# Patient Record
Sex: MALE | Race: WHITE | NOT HISPANIC OR LATINO
[De-identification: names, ages, dates, MRNs, and addresses within clinical notes are randomized per-mention and may not be internally consistent; named-entity substitution may affect disease eponyms.]

---
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
## 2021-01-13 ENCOUNTER — APPOINTMENT (OUTPATIENT)
Dept: PEDIATRIC DEVELOPMENTAL SERVICES | Facility: CLINIC | Age: 21
End: 2021-01-13

## 2021-01-13 ENCOUNTER — APPOINTMENT (OUTPATIENT)
Dept: PEDIATRIC DEVELOPMENTAL SERVICES | Facility: CLINIC | Age: 21
End: 2021-01-13
Payer: COMMERCIAL

## 2021-01-13 PROCEDURE — 99214 OFFICE O/P EST MOD 30 MIN: CPT | Mod: 95

## 2021-03-07 ENCOUNTER — NON-APPOINTMENT (OUTPATIENT)
Age: 21
End: 2021-03-07

## 2021-03-27 ENCOUNTER — NON-APPOINTMENT (OUTPATIENT)
Age: 21
End: 2021-03-27

## 2021-06-07 ENCOUNTER — NON-APPOINTMENT (OUTPATIENT)
Age: 21
End: 2021-06-07

## 2021-06-14 ENCOUNTER — APPOINTMENT (OUTPATIENT)
Dept: PEDIATRIC DEVELOPMENTAL SERVICES | Facility: CLINIC | Age: 21
End: 2021-06-14
Payer: COMMERCIAL

## 2021-06-14 PROCEDURE — 99214 OFFICE O/P EST MOD 30 MIN: CPT | Mod: 95

## 2021-11-09 ENCOUNTER — APPOINTMENT (OUTPATIENT)
Dept: PEDIATRIC DEVELOPMENTAL SERVICES | Facility: CLINIC | Age: 21
End: 2021-11-09
Payer: COMMERCIAL

## 2021-11-09 ENCOUNTER — APPOINTMENT (OUTPATIENT)
Dept: PEDIATRIC DEVELOPMENTAL SERVICES | Facility: CLINIC | Age: 21
End: 2021-11-09

## 2021-11-09 PROCEDURE — 99215 OFFICE O/P EST HI 40 MIN: CPT | Mod: 95

## 2021-11-16 ENCOUNTER — NON-APPOINTMENT (OUTPATIENT)
Age: 21
End: 2021-11-16

## 2022-03-09 ENCOUNTER — APPOINTMENT (OUTPATIENT)
Dept: PEDIATRIC DEVELOPMENTAL SERVICES | Facility: CLINIC | Age: 22
End: 2022-03-09
Payer: COMMERCIAL

## 2022-03-09 PROCEDURE — 99214 OFFICE O/P EST MOD 30 MIN: CPT | Mod: 95

## 2022-05-12 ENCOUNTER — NON-APPOINTMENT (OUTPATIENT)
Age: 22
End: 2022-05-12

## 2022-07-08 ENCOUNTER — APPOINTMENT (OUTPATIENT)
Dept: PEDIATRIC DEVELOPMENTAL SERVICES | Facility: CLINIC | Age: 22
End: 2022-07-08

## 2022-07-08 DIAGNOSIS — F42.4 EXCORIATION (SKIN-PICKING) DISORDER: ICD-10-CM

## 2022-07-08 PROCEDURE — 99214 OFFICE O/P EST MOD 30 MIN: CPT | Mod: 95

## 2023-01-09 ENCOUNTER — APPOINTMENT (OUTPATIENT)
Dept: PEDIATRIC DEVELOPMENTAL SERVICES | Facility: CLINIC | Age: 23
End: 2023-01-09
Payer: COMMERCIAL

## 2023-01-09 PROCEDURE — 99215 OFFICE O/P EST HI 40 MIN: CPT | Mod: 95

## 2023-01-09 NOTE — HISTORY OF PRESENT ILLNESS
[FreeTextEntry5] : \par Completed high school in June 2022 [FreeTextEntry1] : \par Since the last visit, Arnaldo has continued on Zoloft 140 mg po daily (7 ml). His parents think that he should continue with the liquid because he does not tolerate other forms of medication at this time. The medication is overall well-tolerated. His parents do think that the Zoloft has helped target his anxiety better overall but he can still have breakthrough symptoms (e.g., when going to the dentist or doctor). He can be nervous about getting shots and getting anesthesia. He can perseverate when he is worried. He can need frequent reassurance from his parents. \par \par Arnaldo is concerned about the future and having to get a job in the future. The family is exploring various programs and this can cause him stress. He says that he wants to stay at home. \par \par Arnaldo does have significant adaptive functioning deficits across all domains. He continues to require supervision and support at home and at school. His parents do work on life skills at home. He is helping out with the laundry and vacuuming. \par \par He continues to engage in skin picking and nailbiting. He tends to do it more when he is bored. He does not like using fidgets but can use an empty water bottle. Frequency of these behaviors has decreased overall. \par \par He has [employer] eligibility. He does have Medicaid as a supplemental insurance (also is covered by his father's work insurance - [employer]). His parents did get guardianship since the last visit. His older sister is the de facto guardian (going to be a teacher). His parents are interested in transitioning him to adult medical providers. He has a visit with a PCP this month at [facility] and he will be going to their dentist in February 2023. There is psychiatry through the agency as well but there is a long waitlist. He is not currently receiving any home services. Family is also exploring SSI benefits. \par \par His parents are exploring next steps with [employer] (day programs, vocational programs, etc.). The plan is for him to live with his parents.  [Major Illness] : no major illness [Surgery] : no surgery [Hospitalizations] : no hospitalizations [FreeTextEntry6] : None

## 2023-01-09 NOTE — PLAN
[Findings (To Date)] : Findings from evaluation (to date) [Clinical Basis] : Clinical basis for current diagnosis and clinical impressions [Differential Diagnosis] : Differential diagnosis [Goals / Benefits] : Goals & potential benefits of treatment with medication, as well as the limitations of pharmacotherapy [SSRIs] : Potential benefits and limitations of treatment with SSRIs.  Potential adverse events were also reviewed, including suicidal ideation, mariola/activation, nausea, headache, diarrhea/constipation, somnolence, vision changes, rash, etc.  Advised to seek immediate medical attention if any severe side effects or suicidal ideation. [Resources] : Other available resources [Family Questions] : Family's questions were addressed [FreeTextEntry3] : \par Medical Treatment: \par - Continue Zoloft and increase from 7 ml (140 mg) to 8 ml (150 mg) po daily, monitor for SE, call with questions/concerns \par - Discussed potential SE of SSRIs including but not limited to suicidal ideation, mariola/activation, nausea, headache, diarrhea/constipation, somnolence, vision changes, rash, etc.  To seek immediate medical attention if any severe side effects or suicidal ideation.\par - Discussed transitioning to adult providers, has appointments with [facility] in January-February 2023, will likely need to f/u with psychiatry as well since he will no longer be able to follow-up in our practice due to age\par \par Consultation: \par - Medical Geneticist - has been recommended in the past, discussed pros/cons of an evaluation\par - Ophthalmologist \par - s/p Orthopedics for scoliosis\par \par External Interventions and Services: \par - Is OPWDD eligible, has Medicaid, in the process of exploring services\par - Exploring SSI benefits\par \par Complementary or Alternative Treatments: \par - s/p fish oil supplementation, not thought to be effective \par \par Follow-Up: \par - Follow-up visit in March 2023 to evaluate response to medication and monitoring of medication treatment \par - Family prefers telehealth appointments\par - Follow-up telephone call: prn

## 2023-01-09 NOTE — REVIEW OF SYSTEMS
[Vision Problems] : vision problems [Wears Glasses/Contact Lenses] : wears glasses/contact lenses [Anxiety] : anxiety [Normal] : Hematologic/Lymphatic [FreeTextEntry3] : history of conjunctivitis s/p treatment [FreeTextEntry4] : can get seasonal allergies, has a history of cavities, s/p dental surgery [FreeTextEntry5] : had screening EKG that was significant for tachycardia (thought to be due to anxiety, very agitated), has not had any documented tachycardia since then [de-identified] : scoliosis, s/p vest [FreeTextEntry1] : s/p enuresis [de-identified] : finger and skin picking (manageable)

## 2023-01-09 NOTE — REASON FOR VISIT
[Follow-Up Visit] : a follow-up visit [Home] : at home, [unfilled] , at the time of the visit. [Medical Office: ([facility])___] : at the medical office located in  [Parents] : parents [FreeTextEntry2] : Arnaldo is a 21 yo boy with ASD, mild ID, anxiety, and inattention seen for a telehealth visit to discuss medication management and treatment recommendations.  [FreeTextEntry4] : Zoloft (sertraline) 140 mg po daily [FreeTextEntry1] : CELESTINE ERICKSON, Arnaldo [FreeTextEntry3] :  ([facility])

## 2023-01-09 NOTE — PHYSICAL EXAM
[External ears normal] : external ears normal [Normal] : awake and interactive [de-identified] : wearing glasses [de-identified] : Arnaldo was rocking repetitively, he did answer questions but did need some prompting, he had a relatively flat affect (at baseline)

## 2023-03-27 ENCOUNTER — APPOINTMENT (OUTPATIENT)
Dept: PEDIATRIC DEVELOPMENTAL SERVICES | Facility: CLINIC | Age: 23
End: 2023-03-27

## 2023-04-03 ENCOUNTER — APPOINTMENT (OUTPATIENT)
Dept: PEDIATRIC DEVELOPMENTAL SERVICES | Facility: CLINIC | Age: 23
End: 2023-04-03
Payer: COMMERCIAL

## 2023-04-03 PROCEDURE — 99215 OFFICE O/P EST HI 40 MIN: CPT | Mod: 95

## 2023-04-03 NOTE — REVIEW OF SYSTEMS
[Vision Problems] : vision problems [Wears Glasses/Contact Lenses] : wears glasses/contact lenses [Anxiety] : anxiety [Normal] : Hematologic/Lymphatic [FreeTextEntry3] : history of conjunctivitis s/p treatment [FreeTextEntry4] : can get seasonal allergies, has a history of cavities, s/p dental surgery [FreeTextEntry5] : had screening EKG that was significant for tachycardia (thought to be due to anxiety, very agitated), has not had any documented tachycardia since then [de-identified] : scoliosis, s/p vest [FreeTextEntry1] : s/p enuresis [de-identified] : finger and skin picking (manageable)

## 2023-04-03 NOTE — PHYSICAL EXAM
[External ears normal] : external ears normal [Normal] : awake and interactive [de-identified] : wearing glasses [de-identified] : Arnaldo was rocking repetitively, he did answer questions but did need some prompting, he had a relatively flat affect (at baseline)

## 2023-04-03 NOTE — PLAN
[Findings (To Date)] : Findings from evaluation (to date) [Clinical Basis] : Clinical basis for current diagnosis and clinical impressions [Differential Diagnosis] : Differential diagnosis [Goals / Benefits] : Goals & potential benefits of treatment with medication, as well as the limitations of pharmacotherapy [SSRIs] : Potential benefits and limitations of treatment with SSRIs.  Potential adverse events were also reviewed, including suicidal ideation, mariola/activation, nausea, headache, diarrhea/constipation, somnolence, vision changes, rash, etc.  Advised to seek immediate medical attention if any severe side effects or suicidal ideation. [Resources] : Other available resources [Family Questions] : Family's questions were addressed [FreeTextEntry3] : \par Medical Treatment: \par - Continue Zoloft 7.5 ml (150 mg) po daily, monitor for SE, call with questions/concerns \par - Monitor sleep, advised that giving Zoloft later in the day can impact sleep\par - Discussed potential SE of SSRIs including but not limited to suicidal ideation, mariola/activation, nausea, headache, diarrhea/constipation, somnolence, vision changes, rash, etc.  To seek immediate medical attention if any severe side effects or suicidal ideation.\par - Discussed transitioning to adult providers, his PCP seems open to prescribing his medication, there is a waitlist for psychiatry but they are not sure if he needs to go, will keep him in our practice until he has successfully started receiving adult services\par \par Consultation: \par - Medical Geneticist - has been recommended in the past, discussed pros/cons of an evaluation\par - Ophthalmologist \par - Continue f/u with dental\par - s/p Orthopedics for scoliosis\par \par External Interventions and Services: \par - Is OPWDD eligible, has Medicaid, in the process of exploring services\par \par Complementary or Alternative Treatments: \par - s/p fish oil supplementation, not thought to be effective \par \par Follow-Up: \par - Follow-up visit in Summer 2023 to evaluate response to medication and monitoring of medication treatment \par - Family prefers telehealth appointments\par - Follow-up telephone call: prn

## 2023-04-03 NOTE — REASON FOR VISIT
[Follow-Up Visit] : a follow-up visit [Home] : at home, [unfilled] , at the time of the visit. [Medical Office: ([facility])___] : at the medical office located in  [Mother] : mother [Father] : father [FreeTextEntry2] : Arnaldo is a 21 yo boy with ASD, mild ID, anxiety, and inattention seen for a telehealth visit to discuss medication management and treatment recommendations.  [FreeTextEntry4] : Zoloft (sertraline) 150 mg po QPM  [FreeTextEntry1] : DRE SPRAGUE Thomas

## 2023-04-03 NOTE — HISTORY OF PRESENT ILLNESS
[FreeTextEntry5] : \par Completed high school in June 2022 [FreeTextEntry1] : \par Since the last visit, Arnaldo has continued on Zoloft and was titrated up to 150 mg po daily (7.5 ml). His parents think that he should continue with the liquid because he does not tolerate other forms of medication at this time. The medication is overall well-tolerated. His parents do think that the Zoloft has helped target his anxiety better overall. He can still get nervous about going to the dentist/doctor. He can perseverate when he is worried. He can need frequent reassurance from his parents. \par \par The Zoloft can make him tired so parents give him the Zoloft in the PM. They do not think he is activated or have trouble sleeping at night. \par \par [facility]. Had blood work after the last visit and did well with it overall (watched a video beforehand so that he knew what to expect). Going to be seeing dental through [facility] as well. There is psychiatry through the agency as well but there is a long waitlist. \par \par Arnaldo does have significant adaptive functioning deficits across all domains. He continues to require supervision and support at home and at school. His parents do work on life skills at home. He is helping out with some chores at home. \par \par He continues to engage in skin picking and nailbiting. He tends to do it more when he is bored. He does not like using fidgets but can use an empty water bottle. Frequency of these behaviors has decreased overall. \par \par He has [employer]WDD eligibility. He does have Medicaid as a supplemental insurance (also is covered by his father's work insurance - [employer]). His parents do have guardianship. His older sister is the de facto guardian (going to be a teacher). Family decided not to pursue SSI. \par \par His parents are exploring next steps with [employer] (day programs, vocational programs, etc.). The plan is for him to live with his parents. They wanted to give him a little break after completing high school. There is a coordinator who is helping the family (through [employer]).  [Major Illness] : no major illness [Surgery] : no surgery [Hospitalizations] : no hospitalizations [FreeTextEntry6] : None

## 2023-08-14 ENCOUNTER — APPOINTMENT (OUTPATIENT)
Dept: PEDIATRIC DEVELOPMENTAL SERVICES | Facility: CLINIC | Age: 23
End: 2023-08-14
Payer: COMMERCIAL

## 2023-08-14 PROCEDURE — 99215 OFFICE O/P EST HI 40 MIN: CPT | Mod: 95

## 2023-08-14 NOTE — REASON FOR VISIT
[Follow-Up Visit] : a follow-up visit [Home] : at home, [unfilled] , at the time of the visit. [Medical Office: ([facility])___] : at the medical office located in  [Mother] : mother [Father] : father [FreeTextEntry2] : Arnaldo is a 22 yo boy with ASD, mild ID, anxiety, and inattention seen for a telehealth visit to discuss medication management and treatment recommendations.  [FreeTextEntry4] : Zoloft (sertraline) 150 mg po QPM  [FreeTextEntry1] : DRE SPRAGUE Thomas

## 2023-08-14 NOTE — PLAN
[Findings (To Date)] : Findings from evaluation (to date) [Clinical Basis] : Clinical basis for current diagnosis and clinical impressions [Differential Diagnosis] : Differential diagnosis [Goals / Benefits] : Goals & potential benefits of treatment with medication, as well as the limitations of pharmacotherapy [SSRIs] : Potential benefits and limitations of treatment with SSRIs.  Potential adverse events were also reviewed, including suicidal ideation, mariola/activation, nausea, headache, diarrhea/constipation, somnolence, vision changes, rash, etc.  Advised to seek immediate medical attention if any severe side effects or suicidal ideation. [Resources] : Other available resources [Family Questions] : Family's questions were addressed [FreeTextEntry3] :  Current recommendations: - Continue Zoloft 7.5 ml (150 mg) po daily, monitor for SE, call with questions/concerns  - Discussed potential SE of SSRIs including but not limited to suicidal ideation, mariola/activation, nausea, headache, diarrhea/constipation, somnolence, vision changes, rash, etc.  To seek immediate medical attention if any severe side effects or suicidal ideation. - Will need to closely monitor anxiety symptoms once he transitions to a day program through [employer] - Discussed transitioning to adult providers, his PCP seems open to prescribing his medication, will keep him in our practice until he has successfully started receiving adult services - Is OPWDD eligible, has Medicaid, in the process of exploring services  Previous recommendations: - Medical Geneticist - has been recommended in the past, discussed pros/cons of an evaluation - Ophthalmologist follow-up - Continue f/u with dental - s/p Orthopedics for scoliosis - Monitor sleep, advised that giving Zoloft later in the day can impact sleep - s/p fish oil supplementation, not thought to be effective   Follow-Up:  - Follow-up visit in Fall 2023 to evaluate response to medication and monitoring of medication treatment  - Family prefers telehealth appointments - Follow-up telephone call: prn

## 2023-08-14 NOTE — REVIEW OF SYSTEMS
[Vision Problems] : vision problems [Wears Glasses/Contact Lenses] : wears glasses/contact lenses [Anxiety] : anxiety [Normal] : Hematologic/Lymphatic [FreeTextEntry3] : history of conjunctivitis s/p treatment [FreeTextEntry4] : can get seasonal allergies, has a history of cavities, s/p dental surgery [FreeTextEntry5] : had screening EKG that was significant for tachycardia (thought to be due to anxiety, very agitated), has not had any documented tachycardia since then [de-identified] : scoliosis, s/p vest [FreeTextEntry1] : s/p enuresis [de-identified] : finger and skin picking (manageable) [de-identified] : borderline HgbA1c - working on dietary changes

## 2023-08-14 NOTE — HISTORY OF PRESENT ILLNESS
[FreeTextEntry5] : Completed high school in June 2022 [FreeTextEntry1] : Since the last visit, Arnaldo has continued on Zoloft 150 mg po daily (7.5 ml). His parents think that he should continue with the liquid because he does not tolerate other forms of medication at this time. The medication is overall well-tolerated. His parents do think that the Zoloft has helped target his anxiety better overall. He can still get nervous about going to the dentist/doctor. He can worry about what comes next and can worry that his parents are withholding information from him. This seems to occur when he has an upcoming appointment. He can perseverate when he is worried. He can need frequent reassurance from his parents. His parents have a visual calendar to allow him to keep track of appointments.   The Zoloft is overall well-tolerated. He is generally sleeping well and gets around 9 hours/night. He wakes up on his own and seems refreshed. He can sometimes take a daytime nap.   [facility]. Going to be seeing dental through [facility] as well. There is no psychiatry available through [facility]. His PCP can take over medication in the future.   Arnaldo does have significant adaptive functioning deficits across all domains. He continues to require supervision and support at home and at school. His parents do work on life skills at home. He is helping out with some chores at home. His parents are working on self-help skills (e.g., cleaning his glasses, using utensils consistently during meals, preparing some simple food, etc.).   He continues to engage in skin picking and nailbiting. He tends to do it more when he is bored. He does not like using fidgets but can use an empty water bottle. Frequency of these behaviors has significantly decreased overall.   He has OPWDD eligibility. He does have Medicaid as a supplemental insurance (also is covered by his father's work insurance - [employer]). His parents do have guardianship. His older sister is the de facto guardian (going to be a teacher).   His parents are exploring next steps with OPWDD (day programs, vocational programs, etc.). There is a meeting in October to talk about his life plan. The plan is for him to live with his parents. They wanted to give him a little break after completing high school. There is a coordinator who is helping the family (through [employer]). He does have some anxiety about starting something new.  [Major Illness] : no major illness [Surgery] : no surgery [Hospitalizations] : no hospitalizations [FreeTextEntry6] : None

## 2023-08-14 NOTE — PHYSICAL EXAM
[External ears normal] : external ears normal [Normal] : awake and interactive [de-identified] : wearing glasses [de-identified] : Arnaldo was rocking repetitively, he did answer questions but did need some prompting, he had a relatively flat affect (at baseline), he did show some mild distress when talking about future day programs.

## 2023-11-20 ENCOUNTER — APPOINTMENT (OUTPATIENT)
Dept: PEDIATRIC DEVELOPMENTAL SERVICES | Facility: CLINIC | Age: 23
End: 2023-11-20

## 2023-11-28 ENCOUNTER — APPOINTMENT (OUTPATIENT)
Dept: PEDIATRIC DEVELOPMENTAL SERVICES | Facility: CLINIC | Age: 23
End: 2023-11-28
Payer: COMMERCIAL

## 2023-11-28 DIAGNOSIS — R41.840 ATTENTION AND CONCENTRATION DEFICIT: ICD-10-CM

## 2023-11-28 PROCEDURE — 99214 OFFICE O/P EST MOD 30 MIN: CPT | Mod: 95

## 2024-03-19 ENCOUNTER — APPOINTMENT (OUTPATIENT)
Dept: PEDIATRIC DEVELOPMENTAL SERVICES | Facility: CLINIC | Age: 24
End: 2024-03-19
Payer: COMMERCIAL

## 2024-03-19 ENCOUNTER — NON-APPOINTMENT (OUTPATIENT)
Age: 24
End: 2024-03-19

## 2024-03-19 DIAGNOSIS — F41.1 GENERALIZED ANXIETY DISORDER: ICD-10-CM

## 2024-03-19 DIAGNOSIS — F84.0 AUTISTIC DISORDER: ICD-10-CM

## 2024-03-19 DIAGNOSIS — F70 MILD INTELLECTUAL DISABILITIES: ICD-10-CM

## 2024-03-19 PROCEDURE — G2211 COMPLEX E/M VISIT ADD ON: CPT

## 2024-03-19 PROCEDURE — 99215 OFFICE O/P EST HI 40 MIN: CPT | Mod: 95

## 2024-04-02 NOTE — REVIEW OF SYSTEMS
[Vision Problems] : vision problems [Wears Glasses/Contact Lenses] : wears glasses/contact lenses [Anxiety] : anxiety [Normal] : Hematologic/Lymphatic [FreeTextEntry3] : history of conjunctivitis s/p treatment [FreeTextEntry4] : can get seasonal allergies, has a history of cavities, s/p dental surgery [FreeTextEntry5] : had screening EKG that was significant for tachycardia (thought to be due to anxiety, very agitated), has not had any documented tachycardia since then [de-identified] : scoliosis, s/p vest [FreeTextEntry1] : s/p enuresis [de-identified] : finger and skin picking (manageable) [de-identified] : borderline HgbA1c - working on dietary changes

## 2024-04-02 NOTE — PLAN
[Findings (To Date)] : Findings from evaluation (to date) [Clinical Basis] : Clinical basis for current diagnosis and clinical impressions [Differential Diagnosis] : Differential diagnosis [Goals / Benefits] : Goals & potential benefits of treatment with medication, as well as the limitations of pharmacotherapy [SSRIs] : Potential benefits and limitations of treatment with SSRIs.  Potential adverse events were also reviewed, including suicidal ideation, mariola/activation, nausea, headache, diarrhea/constipation, somnolence, vision changes, rash, etc.  Advised to seek immediate medical attention if any severe side effects or suicidal ideation. [Resources] : Other available resources [Family Questions] : Family's questions were addressed [FreeTextEntry3] :  Current recommendations: - Encouragement given about progress with daily living skills - Continue Zoloft 7.5 ml (150 mg) po daily, monitor for SE, call with questions/concerns  - Discussed potential SE of SSRIs including but not limited to suicidal ideation, mariola/activation, nausea, headache, diarrhea/constipation, somnolence, vision changes, rash, etc.  To seek immediate medical attention if any severe side effects or suicidal ideation. - Will need to closely monitor anxiety symptoms once he transitions to a day program through [employer] or starts commhab - Advised with parents that he needs to work with new people since he cannot be fully dependent on immediate family members, advised on slowly increasing his tolerance, advised that brief commhab with parent present (e.g., helping with cooking a meal) might be a nice way to ease him into working with new people after an extended period at home - Discussed transitioning to adult providers, advised to get on waitlist for psychiatry - Is OPWDD eligible, has Medicaid, in the process of exploring services  Previous recommendations: - Medical Geneticist - has been recommended in the past, discussed pros/cons of an evaluation - Ophthalmologist follow-up - Continue f/u with dental - s/p Orthopedics for scoliosis - Monitor sleep, advised that giving Zoloft later in the day can impact sleep - s/p fish oil supplementation, not thought to be effective   Follow-Up:  - Follow-up visit in June 2024 to evaluate response to medication and monitoring of medication treatment  - Family prefers telehealth appointments in general - Follow-up telephone call: prn   Billing: Level 5 E/M due to time (40 minutes),  because patient is being followed longitudinally for atleast one chronic condition by a single provider

## 2024-04-02 NOTE — HISTORY OF PRESENT ILLNESS
[FreeTextEntry1] : Since the last visit, Arnaldo has continued on Zoloft 150 mg po daily (7.5 ml). His parents think that he should continue with the liquid because he does not tolerate other forms of medication at this time. The medication is overall well-tolerated. His parents do think that the Zoloft has helped target his anxiety better overall. His parents feel like his anxiety is generally manageable, but he has had a lot of stress and anxiety about upcoming changes. He is very comfortable at home and is resistant to trying a day program.   The Zoloft is overall well-tolerated.   He has a hard time with any unexpected. He can get very upset when things change or when there are surprises.   He had his dental appointment and actually did well, but had a lot of stress related to thinking that he had periodontal disease.   He is eating and sleeping well.   [facility]. Going to be seeing dental through [facility] as well. Parent says that PCP uncomfortable prescribing medication but thinks that psychiatry available through [facility] (there is a waitlist).   Arnaldo does have significant adaptive functioning deficits across all domains. He continues to require supervision and support at home and at school. His parents do work on life skills at home. He is helping out with some chores at home. His parents are working on self-help skills (e.g., cleaning his glasses, using utensils consistently during meals, preparing some simple food, putting away his clothes, etc.). He has made gains with self-care and grooming activities. He is very routine driven and does well with a schedule.   He continues to engage in skin picking and nailbiting. He tends to do it more when he is bored.   He has OPWDD eligibility. He does have Medicaid as a supplemental insurance (also is covered by his father's work insurance - [employer]). His parents do have guardianship. His older sister is the de facto guardian (going to be a teacher).   His parents are exploring next steps with [employer] (day programs, vocational programs, etc.). There is a meeting about future options soon. This causes him a lot of anxiety. The plan is for him to live with his parents and maybe do some programs. They wanted to give him a little break after completing high school. There is a coordinator who is helping the family (through [employer]). He does have some anxiety about starting something new and says that he does not want to work with other people. He seems to be having increased anxiety about participating in a day program.   Arnaldo does have some interest in learning how to cook. He is resistant to doing a cooking program with other people and just wants to learn about cooking from his parents.  [FreeTextEntry5] : Completed high school in June 2022 [Major Illness] : no major illness [Surgery] : no surgery [Hospitalizations] : no hospitalizations [FreeTextEntry6] : None

## 2024-04-02 NOTE — PHYSICAL EXAM
[External ears normal] : external ears normal [Normal] : awake and interactive [de-identified] : wearing glasses [de-identified] : Arnaldo was rocking repetitively, he did answer questions but did need some prompting, he had a relatively flat affect (at baseline), he did show some mild distress when talking about future day programs.

## 2024-04-02 NOTE — REASON FOR VISIT
[Follow-Up Visit] : a follow-up visit [Home] : at home, [unfilled] , at the time of the visit. [Medical Office: ([facility])___] : at the medical office located in  [Father] : father [FreeTextEntry2] : Arnaldo is a 24 yo boy with ASD, mild ID, anxiety, and inattention seen for a telehealth visit to discuss medication management and treatment recommendations.  Consent for the telehealth visit was obtained from the parent (guardian). There was trouble with audio through [employer] - video from [employer] was used and supplemented with audio from phone.  [FreeTextEntry4] : Zoloft (sertraline) 7.5 ml (150 mg) po QPM  [FreeTextEntry1] : CELESTINE ERICKSON, Arnaldo [FreeTextEntry5] : medical records

## 2024-05-19 NOTE — REASON FOR VISIT
[Home] : at home, [unfilled] , at the time of the visit. [Medical Office: ([facility])___] : at the medical office located in  [Father] : father [Follow-Up Visit] : a follow-up visit [FreeTextEntry2] : Arnaldo is a 22 yo boy with ASD, mild ID, anxiety, and inattention seen for a telehealth visit to discuss medication management and treatment recommendations.  [FreeTextEntry4] : Zoloft (sertraline) 7.5 ml (150 mg) po QPM  [FreeTextEntry1] : Arnaldo ERICKSON

## 2024-05-19 NOTE — PLAN
[Findings (To Date)] : Findings from evaluation (to date) [Differential Diagnosis] : Differential diagnosis [Clinical Basis] : Clinical basis for current diagnosis and clinical impressions [Goals / Benefits] : Goals & potential benefits of treatment with medication, as well as the limitations of pharmacotherapy [SSRIs] : Potential benefits and limitations of treatment with SSRIs.  Potential adverse events were also reviewed, including suicidal ideation, mariola/activation, nausea, headache, diarrhea/constipation, somnolence, vision changes, rash, etc.  Advised to seek immediate medical attention if any severe side effects or suicidal ideation. [Resources] : Other available resources [Family Questions] : Family's questions were addressed [FreeTextEntry3] :  Current recommendations: - Encouragement given about progress with daily living skills - Continue Zoloft 7.5 ml (150 mg) po daily, monitor for SE, call with questions/concerns  - Discussed potential SE of SSRIs including but not limited to suicidal ideation, mariola/activation, nausea, headache, diarrhea/constipation, somnolence, vision changes, rash, etc.  To seek immediate medical attention if any severe side effects or suicidal ideation. - Will need to closely monitor anxiety symptoms once he transitions to a day program through [facility] - Discussed transitioning to adult providers, his PCP seems open to prescribing his medication, will keep him in our practice until he has successfully started receiving adult services - Is [facility] eligible, has Medicaid, in the process of exploring services  Previous recommendations: - Medical Geneticist - has been recommended in the past, discussed pros/cons of an evaluation - Ophthalmologist follow-up - Continue f/u with dental - s/p Orthopedics for scoliosis - Monitor sleep, advised that giving Zoloft later in the day can impact sleep - s/p fish oil supplementation, not thought to be effective   Follow-Up:  - Follow-up visit in Mach 2024 to evaluate response to medication and monitoring of medication treatment  - Family prefers telehealth appointments - Follow-up telephone call: prn

## 2024-05-19 NOTE — HISTORY OF PRESENT ILLNESS
[FreeTextEntry5] : Completed high school in June 2022 [FreeTextEntry1] :  Since the last visit, Arnaldo has continued on Zoloft 150 mg po daily (7.5 ml). His parents think that he should continue with the liquid because he does not tolerate other forms of medication at this time. The medication is overall well-tolerated. His parents do think that the Zoloft has helped target his anxiety better overall. His parents feel like his anxiety is generally manageable.   The Zoloft is overall well-tolerated. He can have some increased anxiety/anticipation around the holidays but it is manageable.   He is eating and sleeping well. He is trying new foods.   [facility]. Going to be seeing dental through [facility] as well. There is no psychiatry available through [facility]. He is getting his physical in early 2024. His PCP can take over medication in the future.   Arnaldo does have significant adaptive functioning deficits across all domains. He continues to require supervision and support at home and at school. His parents do work on life skills at home. He is helping out with some chores at home. His parents are working on self-help skills (e.g., cleaning his glasses, using utensils consistently during meals, preparing some simple food, putting away his clothes, etc.). He has made gains with self-care and grooming activities.   He continues to engage in skin picking and nailbiting. He tends to do it more when he is bored. He does not like using fidgets but can use an empty water bottle. Frequency of these behaviors has significantly decreased overall. His father feels like it has gotten better overall.   He has [facility] eligibility. He does have Medicaid as a supplemental insurance (also is covered by his father's work insurance - [employer]). His parents do have guardianship. His older sister is the de facto guardian (going to be a teacher).   His parents are exploring next steps with [facility] (day programs, vocational programs, etc.). There had a meeting in October to talk about his life plan. The plan is for him to live with his parents and maybe do some programs. They wanted to give him a little break after completing high school. There is a coordinator who is helping the family (through [employer] They did offer commhab but his parents did not think that it would be a good option right now with the family's schedule. He does have some anxiety about starting something new. The next meeting is in January 2024 to decide next steps.  [Major Illness] : no major illness [Surgery] : no surgery [Hospitalizations] : no hospitalizations [FreeTextEntry6] : None

## 2024-05-19 NOTE — PHYSICAL EXAM
[External ears normal] : external ears normal [Normal] : awake and interactive [de-identified] : wearing glasses [de-identified] : rAnaldo was rocking repetitively, he did answer questions but did need some prompting, he had a relatively flat affect (at baseline), he did show some mild distress when talking about future day programs.

## 2024-05-19 NOTE — REVIEW OF SYSTEMS
[Vision Problems] : vision problems [Wears Glasses/Contact Lenses] : wears glasses/contact lenses [Anxiety] : anxiety [Normal] : Hematologic/Lymphatic [FreeTextEntry3] : history of conjunctivitis s/p treatment [FreeTextEntry4] : can get seasonal allergies, has a history of cavities, s/p dental surgery [FreeTextEntry5] : had screening EKG that was significant for tachycardia (thought to be due to anxiety, very agitated), has not had any documented tachycardia since then [de-identified] : scoliosis, s/p vest [FreeTextEntry1] : s/p enuresis [de-identified] : finger and skin picking (manageable) [de-identified] : borderline HgbA1c - working on dietary changes

## 2024-06-04 ENCOUNTER — APPOINTMENT (OUTPATIENT)
Dept: PEDIATRIC DEVELOPMENTAL SERVICES | Facility: CLINIC | Age: 24
End: 2024-06-04

## 2024-08-06 ENCOUNTER — APPOINTMENT (OUTPATIENT)
Dept: PEDIATRIC DEVELOPMENTAL SERVICES | Facility: CLINIC | Age: 24
End: 2024-08-06

## 2024-08-06 PROCEDURE — G2211 COMPLEX E/M VISIT ADD ON: CPT | Mod: NC

## 2024-08-06 PROCEDURE — 99214 OFFICE O/P EST MOD 30 MIN: CPT | Mod: 95

## 2024-08-06 NOTE — REVIEW OF SYSTEMS
[Vision Problems] : vision problems [Wears Glasses/Contact Lenses] : wears glasses/contact lenses [Anxiety] : anxiety [Normal] : Hematologic/Lymphatic [FreeTextEntry3] : history of conjunctivitis s/p treatment [FreeTextEntry4] : can get seasonal allergies, has a history of cavities, s/p dental surgery [FreeTextEntry5] : had screening EKG that was significant for tachycardia (thought to be due to anxiety, very agitated), has not had any documented tachycardia since then [de-identified] : scoliosis, s/p vest [FreeTextEntry1] : s/p enuresis [de-identified] : finger and skin picking (manageable) [de-identified] : borderline HgbA1c - working on dietary changes

## 2024-08-06 NOTE — PLAN
[Findings (To Date)] : Findings from evaluation (to date) [Clinical Basis] : Clinical basis for current diagnosis and clinical impressions [Differential Diagnosis] : Differential diagnosis [Goals / Benefits] : Goals & potential benefits of treatment with medication, as well as the limitations of pharmacotherapy [SSRIs] : Potential benefits and limitations of treatment with SSRIs.  Potential adverse events were also reviewed, including suicidal ideation, mariola/activation, nausea, headache, diarrhea/constipation, somnolence, vision changes, rash, etc.  Advised to seek immediate medical attention if any severe side effects or suicidal ideation. [Resources] : Other available resources [Family Questions] : Family's questions were addressed [FreeTextEntry3] : Current recommendations: - Encouragement given about progress with daily living skills - Continue Zoloft 8 ml (160 mg) po daily, monitor for SE, call with questions/concerns  - Discussed potential SE of SSRIs including but not limited to suicidal ideation, mariola/activation, nausea, headache, diarrhea/constipation, somnolence, vision changes, rash, etc.  To seek immediate medical attention if any severe side effects or suicidal ideation. - Will need to closely monitor anxiety symptoms once he transitions to a day program through [facility] or starts commhab - Advised with parents that he needs to work with new people since he cannot be fully dependent on immediate family members, advised on slowly increasing his tolerance, advised that brief commhab with parent present (e.g., helping with cooking a meal) might be a nice way to ease him into working with new people after an extended period at home - Discussed transitioning to adult providers, on waitlist for psychiatry - Is [facility] eligible, has Medicaid, in the process of exploring services  Previous recommendations: - Medical Geneticist - has been recommended in the past, discussed pros/cons of an evaluation - Ophthalmologist follow-up - Continue f/u with dental - s/p Orthopedics for scoliosis - Monitor sleep, advised that giving Zoloft later in the day can impact sleep - s/p fish oil supplementation, not thought to be effective   Follow-Up:  - Follow-up visit in 4-6 months to evaluate response to medication and monitoring of medication treatment  - Family prefers telehealth appointments in general - Follow-up telephone call: prn   Billing: Level 4 E/M due to time (30 minutes),  because patient is being followed longitudinally for at least one chronic condition by a single provider

## 2024-08-06 NOTE — REASON FOR VISIT
[Follow-Up Visit] : a follow-up visit [Home] : at home, [unfilled] , at the time of the visit. [Medical Office: ([facility])___] : at the medical office located in  [Father] : father [FreeTextEntry2] : Arnaldo is a 22 yo boy with ASD, mild ID, anxiety, and inattention seen for a telehealth visit to discuss medication management and treatment recommendations.  Consent for the telehealth visit was obtained from the parent (guardian).  [FreeTextEntry4] : Zoloft (sertraline) 8 ml (160 mg) po QPM  [FreeTextEntry1] : [facility] [FreeTextEntry5] : medical records

## 2024-08-06 NOTE — HISTORY OF PRESENT ILLNESS
[FreeTextEntry5] : Completed high school in June 2022 [FreeTextEntry1] : Since the last visit, Arnaldo has continued on Zoloft 160 mg po daily (8 ml). His parents think that he should continue with the liquid because he does not tolerate other forms of medication at this time. The medication is overall well-tolerated. His parents do think that the Zoloft has helped target his anxiety better overall. His parents feel like his anxiety is generally manageable, but he has had a lot of stress and anxiety about upcoming changes. He is very comfortable at home and is resistant to trying a day program or doing any services.   His parents don't want to push him to do things that he does not want to do.   Commhab was recommended at the last visit but has not been pursued.   The Zoloft is overall well-tolerated.   He has a hard time with any unexpected. He can get very upset when things change or when there are surprises. He gets agitated when there are calls from the agency about adding services/programs.   He is eating and sleeping well.   [facility] and dental. Parent says that PCP uncomfortable prescribing medication but thinks that psychiatry is available through [facility] (there is a waitlist).   Arnaldo does have significant adaptive functioning deficits across all domains. He continues to require supervision and support at home and at school. His parents do work on life skills at home. He is helping out with some chores at home. His parents are working on self-help skills (e.g., cleaning his glasses, using utensils consistently during meals, preparing some simple food, putting away his clothes, etc.). He has made gains with self-care and grooming activities. He is very routine driven and does well with a schedule.   He continues to engage in skin picking and nailbiting. He tends to do it more when he is bored.   He has [facility] eligibility. He does have Medicaid as a supplemental insurance (also is covered by his father's work insurance - [email or web address]). His parents do have guardianship. His older sister is the de facto guardian (going to be a teacher).   His parents are exploring next steps with [facility] (day programs, vocational programs, etc.). There is a meeting about future options soon. This causes him a lot of anxiety. The plan is for him to live with his parents and maybe do some programs. They wanted to give him a little break after completing high school. There is a coordinator who is helping the family (through [employer]). He does have some anxiety about starting something new and says that he does not want to work with other people. He seems to be having increased anxiety about participating in a day program.   [Major Illness] : no major illness [Surgery] : no surgery [Hospitalizations] : no hospitalizations [FreeTextEntry6] : None

## 2024-08-06 NOTE — PHYSICAL EXAM
[External ears normal] : external ears normal [Normal] : awake and interactive [de-identified] : wearing glasses [de-identified] : Arnaldo was rocking repetitively, he did answer questions but did need some prompting, he had a relatively flat affect (at baseline)

## 2025-01-07 ENCOUNTER — APPOINTMENT (OUTPATIENT)
Dept: PEDIATRIC DEVELOPMENTAL SERVICES | Facility: CLINIC | Age: 25
End: 2025-01-07
Payer: COMMERCIAL

## 2025-01-07 DIAGNOSIS — F70 MILD INTELLECTUAL DISABILITIES: ICD-10-CM

## 2025-01-07 DIAGNOSIS — F41.1 GENERALIZED ANXIETY DISORDER: ICD-10-CM

## 2025-01-07 DIAGNOSIS — F84.0 AUTISTIC DISORDER: ICD-10-CM

## 2025-01-07 PROCEDURE — 99214 OFFICE O/P EST MOD 30 MIN: CPT | Mod: 95

## 2025-01-07 PROCEDURE — G2211 COMPLEX E/M VISIT ADD ON: CPT | Mod: NC

## 2025-06-03 ENCOUNTER — APPOINTMENT (OUTPATIENT)
Dept: PEDIATRIC DEVELOPMENTAL SERVICES | Facility: CLINIC | Age: 25
End: 2025-06-03
Payer: COMMERCIAL

## 2025-06-03 DIAGNOSIS — F70 MILD INTELLECTUAL DISABILITIES: ICD-10-CM

## 2025-06-03 DIAGNOSIS — F42.4 EXCORIATION (SKIN-PICKING) DISORDER: ICD-10-CM

## 2025-06-03 DIAGNOSIS — F84.0 AUTISTIC DISORDER: ICD-10-CM

## 2025-06-03 DIAGNOSIS — F41.1 GENERALIZED ANXIETY DISORDER: ICD-10-CM

## 2025-06-03 PROCEDURE — 99214 OFFICE O/P EST MOD 30 MIN: CPT | Mod: 95
